# Patient Record
Sex: FEMALE | Race: WHITE | NOT HISPANIC OR LATINO | Employment: FULL TIME | URBAN - METROPOLITAN AREA
[De-identification: names, ages, dates, MRNs, and addresses within clinical notes are randomized per-mention and may not be internally consistent; named-entity substitution may affect disease eponyms.]

---

## 2017-01-16 ENCOUNTER — HOSPITAL ENCOUNTER (EMERGENCY)
Facility: HOSPITAL | Age: 27
Discharge: HOME | End: 2017-01-17
Attending: EMERGENCY MEDICINE | Admitting: EMERGENCY MEDICINE

## 2017-01-16 DIAGNOSIS — K08.89 PAIN, DENTAL: Primary | ICD-10-CM

## 2017-01-16 PROCEDURE — 99282 EMERGENCY DEPT VISIT SF MDM: CPT | Performed by: EMERGENCY MEDICINE

## 2017-01-16 PROCEDURE — 99283 EMERGENCY DEPT VISIT LOW MDM: CPT

## 2017-01-17 VITALS
TEMPERATURE: 98.1 F | DIASTOLIC BLOOD PRESSURE: 81 MMHG | OXYGEN SATURATION: 100 % | BODY MASS INDEX: 39.27 KG/M2 | RESPIRATION RATE: 16 BRPM | HEIGHT: 64 IN | SYSTOLIC BLOOD PRESSURE: 129 MMHG | WEIGHT: 230 LBS | HEART RATE: 91 BPM

## 2017-01-17 RX ORDER — HYDROCODONE BITARTRATE AND ACETAMINOPHEN 7.5; 325 MG/1; MG/1
TABLET ORAL
Qty: 12 TABLET | Refills: 0 | Status: SHIPPED | OUTPATIENT
Start: 2017-01-17 | End: 2019-02-10

## 2017-01-17 RX ORDER — HYDROCODONE BITARTRATE AND ACETAMINOPHEN 5; 325 MG/1; MG/1
2 TABLET ORAL ONCE
Status: COMPLETED | OUTPATIENT
Start: 2017-01-17 | End: 2017-01-17

## 2017-01-17 RX ADMIN — HYDROCODONE BITARTRATE AND ACETAMINOPHEN 2 TABLET: 5; 325 TABLET ORAL at 00:43

## 2017-01-17 NOTE — ED PROVIDER NOTES
Subjective   History of Present Illness  History of Present Illness    Chief complaint: Dental pain    Location: Left mandible    Quality/Severity:  Moderate    Timing/Duration: Patient had dental extraction of tooth #17 six days ago and has had persistent pain since.    Modifying Factors: Patient relates that the dentist had some difficulty with the extraction.    Associated Symptoms: Pain extending proximally and distally in the mandible.  The patient relates that 6 days ago she had extractions of teeth #16 and 17.  The patient has healed well on the maxillary extraction, but the mandibular extraction has caused her persistent pain.  The patient thought that perhaps she was getting a dental infection and did take 4 days worth of Amoxil that she had been previously prescribed.  The antibiotics had no effect on the nature of the pain.  No bleeding or discharge.    Narrative: See above    Review of Systems  No fever, bleeding, difficulty swallowing, neck swelling, or change in voice.  Past Medical History   Diagnosis Date   • Migraines        No Known Allergies    Past Surgical History   Procedure Laterality Date   •  section         No family history on file.    Social History     Social History   • Marital status: Single     Spouse name: N/A   • Number of children: N/A   • Years of education: N/A     Social History Main Topics   • Smoking status: Current Every Day Smoker     Packs/day: 0.50   • Smokeless tobacco: None   • Alcohol use Yes   • Drug use: No   • Sexual activity: Defer     Other Topics Concern   • None     Social History Narrative           Objective   Physical Exam   Constitutional: She is oriented to person, place, and time. She appears well-developed and well-nourished.   HENT:   Head: Normocephalic and atraumatic.   Examination of the oral pharynx shows that the extraction sites appear to be healing normally without any significant erythema or swelling.  Patient does have some facial  tenderness over the extraction site without any obvious bruising or swelling.  No trismus   Eyes: Conjunctivae and EOM are normal.   Neurological: She is alert and oriented to person, place, and time.   Nursing note and vitals reviewed.      Procedures    Final diagnoses:   Pain, dental            ED Course  ED Course   Comment By Time   01/17/17, 12:43 AM  The patient was reassured that there did not appear to be any infectious process at this time.  It was explained that her pain was most probably due to localized trauma.  Patient strongly advised to follow-up with her dentist. Eddie Rajan MD 01/17 0043                  MDM  Number of Diagnoses or Management Options  Pain, dental:   Risk of Complications, Morbidity, and/or Mortality  Presenting problems: low  Management options: low      Labs this visit  Lab Results (last 24 hours)     ** No results found for the last 24 hours. **        Prescribed on discharge             Medication List      New Prescriptions          HYDROcodone-acetaminophen 7.5-325 MG per tablet   Commonly known as:  NORCO   1 tablet every 4-6 hours as needed for pain           All lab results, imaging results and other tests were reviewed by Eddie Rajan MD and unless otherwise specified were found to be unremarkable.      Final diagnoses:   Pain, dental            Eddie Rajan MD  01/17/17 0044

## 2017-01-17 NOTE — ED NOTES
Chief Complaint   Patient presents with   • Dental Pain     The patient presents to room 2 ambulatory from waiting room complaining of pain to the left side of her mouth.  Reports she had her wisdom teeth pulled last Wednesday and is still having pain to the site.  She reports that she is unable to eat solids and is eating soups and drinking liquids.  Denies fever or chills.  Afebrile on assessment.  Respirations are even and unlabored with bilateral equal expansion. Family at the bedside.      Francie Meléndez RN  01/17/17 0007

## 2017-10-15 ENCOUNTER — APPOINTMENT (OUTPATIENT)
Dept: GENERAL RADIOLOGY | Facility: HOSPITAL | Age: 27
End: 2017-10-15

## 2017-10-15 ENCOUNTER — HOSPITAL ENCOUNTER (EMERGENCY)
Facility: HOSPITAL | Age: 27
Discharge: HOME | End: 2017-10-15
Attending: EMERGENCY MEDICINE | Admitting: EMERGENCY MEDICINE

## 2017-10-15 VITALS
WEIGHT: 220 LBS | RESPIRATION RATE: 16 BRPM | HEIGHT: 63 IN | SYSTOLIC BLOOD PRESSURE: 136 MMHG | BODY MASS INDEX: 38.98 KG/M2 | TEMPERATURE: 98.5 F | HEART RATE: 98 BPM | OXYGEN SATURATION: 98 % | DIASTOLIC BLOOD PRESSURE: 86 MMHG

## 2017-10-15 DIAGNOSIS — M25.511 ACUTE PAIN OF RIGHT SHOULDER: Primary | ICD-10-CM

## 2017-10-15 PROCEDURE — 99282 EMERGENCY DEPT VISIT SF MDM: CPT | Performed by: EMERGENCY MEDICINE

## 2017-10-15 PROCEDURE — 99283 EMERGENCY DEPT VISIT LOW MDM: CPT

## 2017-10-15 PROCEDURE — 73030 X-RAY EXAM OF SHOULDER: CPT

## 2017-10-15 RX ORDER — HYDROCODONE BITARTRATE AND ACETAMINOPHEN 5; 325 MG/1; MG/1
1 TABLET ORAL ONCE
Status: COMPLETED | OUTPATIENT
Start: 2017-10-15 | End: 2017-10-15

## 2017-10-15 RX ORDER — NAPROXEN 500 MG/1
500 TABLET ORAL 2 TIMES DAILY PRN
Qty: 30 TABLET | Refills: 0 | Status: SHIPPED | OUTPATIENT
Start: 2017-10-15 | End: 2017-12-17

## 2017-10-15 RX ADMIN — HYDROCODONE BITARTRATE AND ACETAMINOPHEN 1 TABLET: 5; 325 TABLET ORAL at 10:34

## 2017-10-15 NOTE — ED PROVIDER NOTES
Subjective   History of Present Illness  History of Present Illness    Chief complaint: Right shoulder pain    Location: Right shoulder    Quality/Severity:  Moderate, sharp    Timing/Onset/Duration: Over the last 2 weeks    Modifying Factors: It hurts to move, feels better to remain still    Associated Symptoms: No numbness, tingling, or weakness.  No fever or chills.    Narrative: This 27-year-old white female presents with right shoulder pain that started 2 weeks ago.  6 years ago the patient had a torn rotator cuff.  The patient denies any fever or chills.  The patient denies any numbness, tingling, weakness, or change in bladder or bowel function.    PCP:  [facility]      Review of Systems   Musculoskeletal:        Right shoulder pain   Neurological: Negative for weakness and numbness.        Medication List      ASK your doctor about these medications          HYDROcodone-acetaminophen 7.5-325 MG per tablet   Commonly known as:  NORCO   1 tablet every 4-6 hours as needed for pain           Past Medical History:   Diagnosis Date   • Migraines        No Known Allergies    Past Surgical History:   Procedure Laterality Date   •  SECTION     • TONSILLECTOMY         No family history on file.    Social History     Social History   • Marital status: Single     Spouse name: N/A   • Number of children: N/A   • Years of education: N/A     Social History Main Topics   • Smoking status: Current Every Day Smoker     Packs/day: 1.00   • Smokeless tobacco: Not on file   • Alcohol use Yes      Comment: will drink socially   • Drug use: No   • Sexual activity: Defer     Other Topics Concern   • Not on file     Social History Narrative   • No narrative on file           Objective   Physical Exam   Constitutional: She is oriented to person, place, and time. She appears well-developed and well-nourished. No distress.   ED Triage Vitals:  Temp: 98.5 °F (36.9 °C) (10/15/17 0957)  Heart Rate: 98 (10/15/17 0942)  Resp: 16 (10/15/17  0942)  BP: 136/86 (10/15/17 0942)  SpO2: 98 % (10/15/17 0957)  Temp src: Oral (10/15/17 0957)  Heart Rate Source: Radial (10/15/17 0942)  Patient Position: n/a  BP Location: n/a  FiO2 (%): n/a    The patient's vitals were reviewed by me.  Unless otherwise noted they are within normal limits.     Musculoskeletal:   There is tenderness upon palpation of the right shoulder.  The capillary refill is less than 2 seconds.  The sensation is intact.  There is decreased abduction secondary to pain.  The radial and ulnar pulses 2+.   Neurological: She is alert and oriented to person, place, and time.   Skin: Skin is dry. No rash noted. She is not diaphoretic. No erythema. No pallor.   Psychiatric: Her behavior is normal.   Nursing note and vitals reviewed.      Procedures         ED Course  ED Course      11:41 AM, 10/15/17:  The patient is diagnosis of right shoulder pain was discussed with her.  I will write her prescription for pain medication.  The patient should follow-up with Dr. Hopper in 1 week.  She should return to emerge department there is increasing pain, numbness, tingling, weakness, worse in any way at all.  All the patient's questions were answered patient will be discharged in good condition.    11:43 AM, 10/15/17:  The patient's Peter report was reviewed by me.  The report number is [id number].            MDM  No orders to display     Labs Reviewed - No data to display  No results found.    Final diagnoses:   None         ED Medications:  Medications - No data to display    New Medications:     Medication List      ASK your doctor about these medications          HYDROcodone-acetaminophen 7.5-325 MG per tablet   Commonly known as:  NORCO   1 tablet every 4-6 hours as needed for pain           Stopped Medications:     Medication List      ASK your doctor about these medications          HYDROcodone-acetaminophen 7.5-325 MG per tablet   Commonly known as:  NORCO   1 tablet every 4-6 hours as needed for pain              Final diagnoses:   Acute pain of right shoulder            Ryland Willson MD  10/15/17 5749

## 2017-10-15 NOTE — DISCHARGE INSTRUCTIONS
Follow-up with Dr. Hopper in 1 week.  Return to emergency department there is increasing pain, numbness, tingling, weakness, worse in any way at all.

## 2017-10-20 ENCOUNTER — OFFICE VISIT (OUTPATIENT)
Dept: ORTHOPEDIC SURGERY | Facility: CLINIC | Age: 27
End: 2017-10-20

## 2017-10-20 VITALS
HEIGHT: 63 IN | HEART RATE: 83 BPM | WEIGHT: 220 LBS | BODY MASS INDEX: 38.98 KG/M2 | SYSTOLIC BLOOD PRESSURE: 107 MMHG | DIASTOLIC BLOOD PRESSURE: 72 MMHG

## 2017-10-20 DIAGNOSIS — S49.91XA INJURY OF RIGHT SHOULDER, INITIAL ENCOUNTER: Primary | ICD-10-CM

## 2017-10-20 PROCEDURE — 99203 OFFICE O/P NEW LOW 30 MIN: CPT | Performed by: NURSE PRACTITIONER

## 2017-10-20 RX ORDER — DIAZEPAM 5 MG/1
5 TABLET ORAL AS NEEDED
Qty: 2 TABLET | Refills: 0 | Status: SHIPPED | OUTPATIENT
Start: 2017-10-20 | End: 2017-10-27

## 2017-10-20 RX ORDER — CYCLOBENZAPRINE HCL 10 MG
10 TABLET ORAL 3 TIMES DAILY PRN
Qty: 30 TABLET | Refills: 0 | Status: SHIPPED | OUTPATIENT
Start: 2017-10-20 | End: 2019-02-10
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
## 2017-10-25 ENCOUNTER — HOSPITAL ENCOUNTER (OUTPATIENT)
Dept: MRI IMAGING | Facility: HOSPITAL | Age: 27
Discharge: HOME | End: 2017-10-25

## 2017-10-25 ENCOUNTER — HOSPITAL ENCOUNTER (OUTPATIENT)
Dept: GENERAL RADIOLOGY | Facility: HOSPITAL | Age: 27
Discharge: HOME | End: 2017-10-25
Admitting: NURSE PRACTITIONER

## 2017-10-25 DIAGNOSIS — S49.91XA INJURY OF RIGHT SHOULDER, INITIAL ENCOUNTER: ICD-10-CM

## 2017-10-25 PROCEDURE — 0 IOPAMIDOL 61 % SOLUTION: Performed by: NURSE PRACTITIONER

## 2017-10-25 PROCEDURE — 73222 MRI JOINT UPR EXTREM W/DYE: CPT

## 2017-10-25 PROCEDURE — A9577 INJ MULTIHANCE: HCPCS | Performed by: NURSE PRACTITIONER

## 2017-10-25 PROCEDURE — 73040 CONTRAST X-RAY OF SHOULDER: CPT

## 2017-10-25 PROCEDURE — 0 GADOBENATE DIMEGLUMINE 529 MG/ML SOLUTION: Performed by: NURSE PRACTITIONER

## 2017-10-25 RX ORDER — LIDOCAINE HYDROCHLORIDE 10 MG/ML
5 INJECTION, SOLUTION INFILTRATION; PERINEURAL ONCE
Status: COMPLETED | OUTPATIENT
Start: 2017-10-25 | End: 2017-10-25

## 2017-10-25 RX ADMIN — LIDOCAINE HYDROCHLORIDE 5 ML: 10 INJECTION, SOLUTION INFILTRATION; PERINEURAL at 12:00

## 2017-10-25 RX ADMIN — GADOBENATE DIMEGLUMINE 1 ML: 529 INJECTION, SOLUTION INTRAVENOUS at 11:15

## 2017-10-25 RX ADMIN — IOPAMIDOL 25 ML: 612 INJECTION, SOLUTION INTRAVENOUS at 12:00

## 2017-10-27 ENCOUNTER — OFFICE VISIT (OUTPATIENT)
Dept: ORTHOPEDIC SURGERY | Facility: CLINIC | Age: 27
End: 2017-10-27

## 2017-10-27 DIAGNOSIS — M19.011 ARTHROSIS OF RIGHT ACROMIOCLAVICULAR JOINT: ICD-10-CM

## 2017-10-27 DIAGNOSIS — M67.911 TENDINOPATHY OF RIGHT ROTATOR CUFF: Primary | ICD-10-CM

## 2017-10-27 PROCEDURE — 99213 OFFICE O/P EST LOW 20 MIN: CPT | Performed by: NURSE PRACTITIONER

## 2017-10-27 PROCEDURE — 20610 DRAIN/INJ JOINT/BURSA W/O US: CPT | Performed by: NURSE PRACTITIONER

## 2017-10-27 RX ORDER — TRIAMCINOLONE ACETONIDE 40 MG/ML
80 INJECTION, SUSPENSION INTRA-ARTICULAR; INTRAMUSCULAR
Status: COMPLETED | OUTPATIENT
Start: 2017-10-27 | End: 2017-10-27

## 2017-10-27 RX ORDER — BUPIVACAINE HYDROCHLORIDE 5 MG/ML
2 INJECTION, SOLUTION EPIDURAL; INTRACAUDAL
Status: COMPLETED | OUTPATIENT
Start: 2017-10-27 | End: 2017-10-27

## 2017-10-27 RX ORDER — LIDOCAINE HYDROCHLORIDE 10 MG/ML
2 INJECTION, SOLUTION EPIDURAL; INFILTRATION; INTRACAUDAL; PERINEURAL
Status: COMPLETED | OUTPATIENT
Start: 2017-10-27 | End: 2017-10-27

## 2017-10-27 RX ADMIN — BUPIVACAINE HYDROCHLORIDE 2 ML: 5 INJECTION, SOLUTION EPIDURAL; INTRACAUDAL at 09:43

## 2017-10-27 RX ADMIN — LIDOCAINE HYDROCHLORIDE 2 ML: 10 INJECTION, SOLUTION EPIDURAL; INFILTRATION; INTRACAUDAL; PERINEURAL at 09:43

## 2017-10-27 RX ADMIN — TRIAMCINOLONE ACETONIDE 80 MG: 40 INJECTION, SUSPENSION INTRA-ARTICULAR; INTRAMUSCULAR at 09:43
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
## 2017-11-07 ENCOUNTER — HOSPITAL ENCOUNTER (EMERGENCY)
Facility: HOSPITAL | Age: 27
Discharge: HOME | End: 2017-11-07
Attending: EMERGENCY MEDICINE | Admitting: EMERGENCY MEDICINE

## 2017-11-07 VITALS
OXYGEN SATURATION: 97 % | TEMPERATURE: 97.6 F | HEART RATE: 80 BPM | SYSTOLIC BLOOD PRESSURE: 136 MMHG | RESPIRATION RATE: 14 BRPM | WEIGHT: 220 LBS | HEIGHT: 63 IN | DIASTOLIC BLOOD PRESSURE: 79 MMHG | BODY MASS INDEX: 38.98 KG/M2

## 2017-11-07 DIAGNOSIS — G43.809 OTHER MIGRAINE WITHOUT STATUS MIGRAINOSUS, NOT INTRACTABLE: Primary | ICD-10-CM

## 2017-11-07 PROCEDURE — 99282 EMERGENCY DEPT VISIT SF MDM: CPT

## 2017-11-07 PROCEDURE — 25010000002 KETOROLAC TROMETHAMINE PER 15 MG: Performed by: EMERGENCY MEDICINE

## 2017-11-07 PROCEDURE — 96375 TX/PRO/DX INJ NEW DRUG ADDON: CPT

## 2017-11-07 PROCEDURE — 99282 EMERGENCY DEPT VISIT SF MDM: CPT | Performed by: EMERGENCY MEDICINE

## 2017-11-07 PROCEDURE — 25010000002 PROCHLORPERAZINE EDISYLATE PER 10 MG: Performed by: EMERGENCY MEDICINE

## 2017-11-07 PROCEDURE — 96365 THER/PROPH/DIAG IV INF INIT: CPT

## 2017-11-07 PROCEDURE — 25010000002 DEXAMETHASONE PER 1 MG: Performed by: EMERGENCY MEDICINE

## 2017-11-07 RX ORDER — DEXAMETHASONE SODIUM PHOSPHATE 10 MG/ML
INJECTION INTRAMUSCULAR; INTRAVENOUS
Status: DISCONTINUED
Start: 2017-11-07 | End: 2017-11-07 | Stop reason: HOSPADM

## 2017-11-07 RX ORDER — KETOROLAC TROMETHAMINE 30 MG/ML
30 INJECTION, SOLUTION INTRAMUSCULAR; INTRAVENOUS EVERY 6 HOURS PRN
Status: DISCONTINUED | OUTPATIENT
Start: 2017-11-07 | End: 2017-11-07 | Stop reason: HOSPADM

## 2017-11-07 RX ADMIN — KETOROLAC TROMETHAMINE 30 MG: 30 INJECTION INTRAMUSCULAR; INTRAVENOUS at 01:20

## 2017-11-07 RX ADMIN — SODIUM CHLORIDE 1000 ML: 9 INJECTION, SOLUTION INTRAVENOUS at 01:17

## 2017-11-07 RX ADMIN — DEXAMETHASONE SODIUM PHOSPHATE 10 MG: 4 INJECTION, SOLUTION INTRAMUSCULAR; INTRAVENOUS at 01:22

## 2017-11-07 RX ADMIN — PROCHLORPERAZINE EDISYLATE 10 MG: 5 INJECTION INTRAMUSCULAR; INTRAVENOUS at 01:18

## 2017-11-07 NOTE — ED PROVIDER NOTES
Subjective   History of Present Illness  History of Present Illness    Chief complaint: Headache    Location: Behind the eyes and evening around to the back of the head    Quality/Severity:  Moderate, sharp    Timing/Onset/Duration: Gradual onset since 2 PM today    Modifying Factors: Bright light bothers her eyes, nothing seems to make it better    Associated Symptoms: The patient denies any fever.  She has had chills.  She complains of sore throat secondary to the vomiting.  No earache or nasal congestion.  No chest pain or shortness of breath.  No abdominal pain other than some upper abdominal pain that she associates with the vomiting.  No diarrhea or burning when she urinates.  The first day of her last normal menstrual period was last of September.    Narrative: This 27-year-old white female with a history of migraine headaches presents with migraines is started at 20 3 PM.  She has been able to get relief.  She states they are like her usual migraines.  The patient denies any fever.  She has chills.  She denies any trauma.  She complains of nausea and vomiting is been nonbloody and nonbilious.  The patient denies any chest pain or shortness of breath.  There is abdominal pain related to vomiting.  There is no diarrhea or burning when she urinates.  The patient denies that she could be pregnant.  Patient has had a CT of her head that has been unremarkable and workup of her migraines.    PCP:Gaetano      Review of Systems   Constitutional: Positive for chills. Negative for fever.   HENT: Positive for sore throat. Negative for congestion and ear pain.    Eyes: Positive for photophobia.   Respiratory: Negative for chest tightness and shortness of breath.    Cardiovascular: Negative for chest pain.   Gastrointestinal: Positive for abdominal pain (mild upper abdominal pain), nausea and vomiting. Negative for diarrhea.   Genitourinary: Negative for dysuria.   Musculoskeletal: Negative for back pain and neck pain.    Skin: Negative for rash.   Neurological: Negative for weakness, numbness and headaches.   Hematological: Negative for adenopathy.   Psychiatric/Behavioral: Negative for confusion.        Medication List      ASK your doctor about these medications          cyclobenzaprine 10 MG tablet   Commonly known as:  FLEXERIL   Take 1 tablet by mouth 3 (Three) Times a Day As Needed for Muscle Spasms.       HYDROcodone-acetaminophen 7.5-325 MG per tablet   Commonly known as:  NORCO   1 tablet every 4-6 hours as needed for pain       naproxen 500 MG tablet   Commonly known as:  NAPROSYN   Take 1 tablet by mouth 2 (Two) Times a Day As Needed for Moderate Pain .           Past Medical History:   Diagnosis Date   • Asthma    • Depression    • Migraines        No Known Allergies    Past Surgical History:   Procedure Laterality Date   •  SECTION     • TONSILLECTOMY         Family History   Problem Relation Age of Onset   • Hypertension Mother    • Heart disease Mother    • Cancer Mother        Social History     Social History   • Marital status: Single     Spouse name: N/A   • Number of children: N/A   • Years of education: N/A     Social History Main Topics   • Smoking status: Current Every Day Smoker     Packs/day: 1.00   • Smokeless tobacco: Not on file   • Alcohol use Yes      Comment: will drink socially   • Drug use: No   • Sexual activity: Defer     Other Topics Concern   • Not on file     Social History Narrative           Objective   Physical Exam   Constitutional: She is oriented to person, place, and time. She appears well-developed and well-nourished. No distress.   ED Triage Vitals:  Temp: 97.6 °F (36.4 °C) (17)  Heart Rate: 82 (17)  Resp: 14 (17)  BP: 136/103 (17)  SpO2: 97 % (17)  Temp src: Oral (17)  Heart Rate Source: n/a  Patient Position: n/a  BP Location: n/a  FiO2 (%): n/a    The patient's vitals were reviewed by me.  Unless otherwise  noted they are within normal limits.  The patient is hypertensive with a blood pressure 136/103.     HENT:   Head: Normocephalic and atraumatic.   Right Ear: External ear normal.   Left Ear: External ear normal.   Nose: Nose normal.   Mouth/Throat: Oropharynx is clear and moist.   Eyes: Conjunctivae and EOM are normal. Pupils are equal, round, and reactive to light. Right eye exhibits no discharge. Left eye exhibits no discharge. No scleral icterus.   Neck: Normal range of motion. Neck supple. No JVD present. No tracheal deviation present. No thyromegaly present.   There is no meningeal signs   Cardiovascular: Normal rate, regular rhythm, normal heart sounds and intact distal pulses.  Exam reveals no gallop and no friction rub.    No murmur heard.  Pulmonary/Chest: Effort normal and breath sounds normal. No stridor. No respiratory distress. She has no wheezes. She has no rales. She exhibits no tenderness.   Abdominal: Soft. Bowel sounds are normal. She exhibits no distension and no mass. There is tenderness (mild upper abdominal pain at the patient relates to her vomiting). There is no rebound and no guarding. No hernia.   Musculoskeletal: Normal range of motion. She exhibits no edema or deformity.   Lymphadenopathy:     She has no cervical adenopathy.   Neurological: She is alert and oriented to person, place, and time. No cranial nerve deficit. She exhibits normal muscle tone. Coordination normal.   Skin: Skin is warm and dry. No rash noted. She is not diaphoretic. No erythema. No pallor.   Psychiatric: Her behavior is normal.   Nursing note and vitals reviewed.      Procedures         ED Course  ED Course    2:20 AM, 11/07/17:  The patient was reassessed.  Her vital signs were reviewed and are stable.  The patient feels much better and would like to go home.  Neurological exam: Conscious alert and oriented x 4 with no focal deficits noted.  There is no meningeal signs.    2:20 AM, 11/07/17:  Patient's diagnosis  of migraine headache was discussed with her.  She has been advised to follow-up with Dr. finney this week if not completely better.  She should return to emergency department if she has increasing pain, numbness, tingling, weakness, change in bladder or bowel function, fever, chills, worse in any way at all.  The patient's questions were answered she will be discharged in good condition.              MDM  No orders to display     Labs Reviewed - No data to display  Xr Shoulder 2+ View Right    Result Date: 10/16/2017  Narrative: INDICATION: Anterior shoulder pain for 2 weeks. Lifting injury.  FINDINGS: 3 views of the right shoulder without comparison. There is no acute fracture or dislocation. Acromioclavicular and glenohumeral articulations are normal.      Impression: Negative right shoulder.  This report was finalized on 10/16/2017 8:49 AM by Dr. Sergio Parisi MD.      Mri Shoulder Right Arthrogram    Result Date: 10/25/2017  Narrative: RIGHT SHOULDER MRI ARTHROGRAM 10/25/2017 HISTORY:  27-year-old female with right shoulder pain for 3 weeks status post possible lifting injury.  No prior right shoulder surgery. COMPARISON:  Right shoulder x-rays 10/15/2017.  Conventional right shoulder arthrogram 10/25/2017. TECHNIQUE:  Routine unenhanced multiplanar, multisequence high field MR imaging of the right shoulder was performed following the intraarticular administration of dilute gadolinium. FINDINGS:  There is mild supraspinatus and infraspinatus tendinopathy.  No evidence of tear.  Teres minor and subscapularis tendons are intact.  Long biceps tendon is intact and well positioned in the bicipital groove. Evaluation of the labrum is slightly limited by motion artifact on multiple sequences.  Allowing for this, there is no convincing evidence of a discrete or displaced SLAP tear.  The remainder of the glenoid labrum appears intact.  Glenohumeral articular cartilage is intact.  No loose intraarticular bodies. Minimal  degenerative change of the acromioclavicular joint.  No subacromial spur.  No significant inflammation of the subacromial/subdeltoid bursa.  Bone marrow signal is within expected limits.  Visualized musculature is unremarkable.     Impression: 1. Slightly motion limited exam. 2. Mild supraspinatus and infraspinatus tendinopathy.  No evidence of a rotator cuff tear. 3. No significant labral pathology. 4. Mild acromioclavicular joint arthrosis.      Fl Arthrogram Shoulder Right    Result Date: 10/25/2017  Narrative: RIGHT SHOULDER ARTHROGRAM PRIOR TO MRI, 10/25/2017:  HISTORY: 27-year-old female complaining of three week history of right shoulder pain. She reports a past diagnosis of rotator cuff tear about six years ago (details unavailable).  CONSENT: Procedure, risks and alternatives were discussed in detail with the patient, including the low risk of allergy and infection; patient questions were answered, and informed consent was obtained. Timeout was observed in the procedure room for patient identification and procedure site verification. The procedure site was marked by me.  PROCEDURE: *  Fluoroscopy time 01 minute, 23 seconds. *  7 fluoroscopic images were recorded.  Using sterile technique, 1% lidocaine local anesthetic and fluoroscopic guidance, a 22-gauge spinal needle was placed into the right shoulder joint capsule without difficulty. Arthrogram was performed using about 8 cc of iodinated contrast (Isovue-300) and dilute gadolinium contrast mixture (MultiHance). Arthrogram images were recorded during and after gentle shoulder range of motion maneuvers under fluoroscopic observation. The patient was then sent for MR imaging.  FINDINGS: No full-thickness rotator cuff tendon tear is demonstrated. Shoulder joint capsule appears normal. Please see MRI to follow.      Impression: Negative right shoulder arthrogram prior to MRI.  This report was finalized on 10/25/2017 12:01 PM by Dr. Bull López MD.         Final diagnoses:   None         ED Medications:  Medications - No data to display    New Medications:     Medication List      ASK your doctor about these medications          cyclobenzaprine 10 MG tablet   Commonly known as:  FLEXERIL   Take 1 tablet by mouth 3 (Three) Times a Day As Needed for Muscle Spasms.       HYDROcodone-acetaminophen 7.5-325 MG per tablet   Commonly known as:  NORCO   1 tablet every 4-6 hours as needed for pain       naproxen 500 MG tablet   Commonly known as:  NAPROSYN   Take 1 tablet by mouth 2 (Two) Times a Day As Needed for Moderate Pain .           Stopped Medications:     Medication List      ASK your doctor about these medications          cyclobenzaprine 10 MG tablet   Commonly known as:  FLEXERIL   Take 1 tablet by mouth 3 (Three) Times a Day As Needed for Muscle Spasms.       HYDROcodone-acetaminophen 7.5-325 MG per tablet   Commonly known as:  NORCO   1 tablet every 4-6 hours as needed for pain       naproxen 500 MG tablet   Commonly known as:  NAPROSYN   Take 1 tablet by mouth 2 (Two) Times a Day As Needed for Moderate Pain .             Final diagnoses:   Other migraine without status migrainosus, not intractable            Ryland Willson MD  11/07/17 0223

## 2017-11-07 NOTE — DISCHARGE INSTRUCTIONS
Follow-up with Dr. finney this week if not completely better.  Return to emergency department if you have increasing pain, numbness, tingling, weakness, change in bladder or bowel function, fever, chills, worse in any way at all.

## 2017-11-20 ENCOUNTER — TELEPHONE (OUTPATIENT)
Dept: ORTHOPEDIC SURGERY | Facility: CLINIC | Age: 27
End: 2017-11-20

## 2017-11-20 NOTE — TELEPHONE ENCOUNTER
PATIENT CALLED SAYING THAT MARICARMEN SAID IF SHE WAS FEELING UP TO GOING BACK TO WORK SOONER SHE COULD CALL BACK AND GET A NOTE.  PATIENT REQUESTING TO GO BACK TO WORK ON 11.24.17

## 2017-12-09 ENCOUNTER — APPOINTMENT (OUTPATIENT)
Dept: CT IMAGING | Facility: HOSPITAL | Age: 27
End: 2017-12-09

## 2017-12-09 ENCOUNTER — HOSPITAL ENCOUNTER (EMERGENCY)
Facility: HOSPITAL | Age: 27
Discharge: HOME | End: 2017-12-09
Attending: EMERGENCY MEDICINE | Admitting: EMERGENCY MEDICINE

## 2017-12-09 VITALS
TEMPERATURE: 97.6 F | RESPIRATION RATE: 20 BRPM | DIASTOLIC BLOOD PRESSURE: 85 MMHG | BODY MASS INDEX: 38.09 KG/M2 | HEART RATE: 111 BPM | SYSTOLIC BLOOD PRESSURE: 169 MMHG | HEIGHT: 63 IN | OXYGEN SATURATION: 99 % | WEIGHT: 215 LBS

## 2017-12-09 DIAGNOSIS — G43.009 MIGRAINE WITHOUT AURA AND WITHOUT STATUS MIGRAINOSUS, NOT INTRACTABLE: Primary | ICD-10-CM

## 2017-12-09 PROCEDURE — 99282 EMERGENCY DEPT VISIT SF MDM: CPT | Performed by: EMERGENCY MEDICINE

## 2017-12-09 PROCEDURE — 96374 THER/PROPH/DIAG INJ IV PUSH: CPT

## 2017-12-09 PROCEDURE — 25010000002 KETOROLAC TROMETHAMINE PER 15 MG: Performed by: EMERGENCY MEDICINE

## 2017-12-09 PROCEDURE — 96361 HYDRATE IV INFUSION ADD-ON: CPT

## 2017-12-09 PROCEDURE — 70450 CT HEAD/BRAIN W/O DYE: CPT

## 2017-12-09 PROCEDURE — 96375 TX/PRO/DX INJ NEW DRUG ADDON: CPT

## 2017-12-09 PROCEDURE — 25010000002 DEXAMETHASONE PER 1 MG

## 2017-12-09 PROCEDURE — 25010000002 PROCHLORPERAZINE EDISYLATE PER 10 MG: Performed by: EMERGENCY MEDICINE

## 2017-12-09 PROCEDURE — 99283 EMERGENCY DEPT VISIT LOW MDM: CPT

## 2017-12-09 RX ORDER — BUTALBITAL, ACETAMINOPHEN AND CAFFEINE 50; 325; 40 MG/1; MG/1; MG/1
1 TABLET ORAL EVERY 4 HOURS PRN
COMMUNITY
End: 2019-02-10

## 2017-12-09 RX ORDER — KETOROLAC TROMETHAMINE 30 MG/ML
30 INJECTION, SOLUTION INTRAMUSCULAR; INTRAVENOUS EVERY 6 HOURS PRN
Status: DISCONTINUED | OUTPATIENT
Start: 2017-12-09 | End: 2017-12-09 | Stop reason: HOSPADM

## 2017-12-09 RX ORDER — DEXAMETHASONE SODIUM PHOSPHATE 10 MG/ML
INJECTION INTRAMUSCULAR; INTRAVENOUS
Status: COMPLETED
Start: 2017-12-09 | End: 2017-12-09

## 2017-12-09 RX ADMIN — KETOROLAC TROMETHAMINE 30 MG: 30 INJECTION INTRAMUSCULAR; INTRAVENOUS at 18:45

## 2017-12-09 RX ADMIN — SODIUM CHLORIDE 1000 ML: 9 INJECTION, SOLUTION INTRAVENOUS at 18:40

## 2017-12-09 RX ADMIN — DEXAMETHASONE SODIUM PHOSPHATE 10 MG: 10 INJECTION INTRAMUSCULAR; INTRAVENOUS at 18:49

## 2017-12-09 RX ADMIN — PROCHLORPERAZINE EDISYLATE 10 MG: 5 INJECTION INTRAMUSCULAR; INTRAVENOUS at 18:40

## 2017-12-09 NOTE — ED PROVIDER NOTES
Subjective   History of Present Illness  History of Present Illness    Chief complaint: Headache    Location: Home    Quality/Severity:  Moderate, not worst headache of life, like usual migraine    Timing/Onset/Duration: Gradual onset around noon    Modifying Factors: Bright lights seem to make it worse, Fioricet did not make it better    Associated Symptoms: No fever chills or cough.  No sore throat earache or nasal congestion.  No chest pain or shortness breath.  No abdominal pain.  The patient had diarrhea ×2 this been nonbloody.  No fever or chills.    Narrative: This 27-year-old white female presents with a headache that began around noon and has gotten worse.  It is been a gradual onset headache that has not been the worst headache of her life.  The patient has been having more frequent migraines in usual.  She had 3 this month.  Patient took Fioricet just before 3:00 and is not feeling much better.  Her pain is from the back of her neck around to the front of her eyes.  He states it is like her usual migraine.  Patient is never had a CT or MRI workup of her migraine.  The patient's had nausea and vomiting is been nonbloody and nonbilious.  Patient denies any fever or chills.  No cough sore throat earache or nasal congestion.  No chest pain or shortness of breath.  No abdominal pain.  She has had nonbloody diarrhea ×2.  No burning when she urinates.  No vaginal discharge.  She denies trauma.  The first day of her last normal menstrual period, the patient started her menstrual period today.    PCP:  [facility]et[facility]      Review of Systems   Constitutional: Negative for chills and fever.   HENT: Negative for congestion, ear pain and sore throat.    Eyes: Positive for photophobia. Negative for pain and discharge.   Respiratory: Negative for cough, shortness of breath and wheezing.    Cardiovascular: Negative for chest pain.   Gastrointestinal: Positive for diarrhea, nausea and vomiting. Negative for abdominal pain, blood in  stool and constipation.   Genitourinary: Negative for difficulty urinating.   Musculoskeletal: Negative for back pain, neck pain and neck stiffness.   Skin: Negative for rash.   Neurological: Positive for facial asymmetry and headaches. Negative for syncope, weakness and numbness.   Hematological: Negative for adenopathy.   Psychiatric/Behavioral: Negative for confusion.        Medication List      ASK your doctor about these medications          butalbital-acetaminophen-caffeine -40 MG per tablet   Commonly known as:  FIORICET, ESGIC       cyclobenzaprine 10 MG tablet   Commonly known as:  FLEXERIL   Take 1 tablet by mouth 3 (Three) Times a Day As Needed for Muscle Spasms.       HYDROcodone-acetaminophen 7.5-325 MG per tablet   Commonly known as:  NORCO   1 tablet every 4-6 hours as needed for pain       naproxen 500 MG tablet   Commonly known as:  NAPROSYN   Take 1 tablet by mouth 2 (Two) Times a Day As Needed for Moderate Pain .           Past Medical History:   Diagnosis Date   • Asthma    • Depression    • Migraines        No Known Allergies    Past Surgical History:   Procedure Laterality Date   •  SECTION     • TONSILLECTOMY         Family History   Problem Relation Age of Onset   • Hypertension Mother    • Heart disease Mother    • Cancer Mother        Social History     Social History   • Marital status: Single     Spouse name: N/A   • Number of children: N/A   • Years of education: N/A     Social History Main Topics   • Smoking status: Current Every Day Smoker     Packs/day: 1.00   • Smokeless tobacco: None   • Alcohol use Yes      Comment: will drink socially   • Drug use: No   • Sexual activity: Defer     Other Topics Concern   • None     Social History Narrative   • None           Objective   Physical Exam   Constitutional: She is oriented to person, place, and time. She appears well-developed and well-nourished. No distress.   ED Triage Vitals:  Temp: 98.2 °F (36.8 °C) (17  1811)  Heart Rate: 93 (12/09/17 1811)  Resp: 20 (12/09/17 1811)  BP: 150/91 (12/09/17 1811)  SpO2: 99 % (12/09/17 1811)  Temp src: Oral (12/09/17 1811)  Heart Rate Source: n/a  Patient Position: Sitting (12/09/17 1811)  BP Location: Right arm (12/09/17 1811)  FiO2 (%): n/a    The patient's vitals were reviewed by me.  Unless otherwise noted they are within normal limits.     HENT:   Head: Normocephalic and atraumatic.   Right Ear: External ear normal.   Left Ear: External ear normal.   Nose: Nose normal.   Mouth/Throat: Oropharynx is clear and moist.   Eyes: Conjunctivae and EOM are normal. Pupils are equal, round, and reactive to light. Right eye exhibits no discharge. Left eye exhibits no discharge. No scleral icterus.   Neck: Normal range of motion. Neck supple. No JVD present. No tracheal deviation present. No thyromegaly present.   There is no meningeal signs   Cardiovascular: Normal rate, regular rhythm, normal heart sounds and intact distal pulses.  Exam reveals no gallop and no friction rub.    No murmur heard.  Pulmonary/Chest: Effort normal and breath sounds normal. No stridor. No respiratory distress. She has no wheezes. She has no rales. She exhibits no tenderness.   Abdominal: Soft. Bowel sounds are normal. She exhibits no distension and no mass. There is no tenderness. There is no rebound and no guarding. No hernia.   Musculoskeletal: Normal range of motion. She exhibits no edema or deformity.   Lymphadenopathy:     She has no cervical adenopathy.   Neurological: She is alert and oriented to person, place, and time. No cranial nerve deficit. She exhibits normal muscle tone. Coordination normal.   Skin: Skin is warm and dry. No rash noted. She is not diaphoretic. No erythema. No pallor.   Psychiatric: Her behavior is normal.   Nursing note and vitals reviewed.      Procedures         ED Course  ED Course    8:31 PM, 12/09/17:  Patient was reassessed.  She feels better.  Her vital signs were reviewed and  are stable.  Neurological exam: Conscious alert oriented ×4 with no focal deficits noted.  There is no meningeal signs.    8:31 PM, 12/09/17:  The patient's diagnosis of migraine was discussed with her.  Patient should follow-up with her primary care provider within one week.  She should return to emergency primary she has worsening pain, fever, chills, worse in any way at all.  All of the patient's questions were answered patient will be discharged in good condition.              MDM  CT Head Without Contrast    (Results Pending)     Labs Reviewed - No data to display  No results found.    Final diagnoses:   None         ED Medications:  Medications   sodium chloride 0.9 % bolus 1,000 mL (not administered)   prochlorperazine (COMPAZINE) injection 10 mg (not administered)   dexamethasone (DECADRON) 10 mg in sodium chloride 0.9 % IVPB (not administered)   ketorolac (TORADOL) injection 30 mg (not administered)       New Medications:     Medication List      ASK your doctor about these medications          butalbital-acetaminophen-caffeine -40 MG per tablet   Commonly known as:  FIORICET, ESGIC       cyclobenzaprine 10 MG tablet   Commonly known as:  FLEXERIL   Take 1 tablet by mouth 3 (Three) Times a Day As Needed for Muscle Spasms.       HYDROcodone-acetaminophen 7.5-325 MG per tablet   Commonly known as:  NORCO   1 tablet every 4-6 hours as needed for pain       naproxen 500 MG tablet   Commonly known as:  NAPROSYN   Take 1 tablet by mouth 2 (Two) Times a Day As Needed for Moderate Pain .           Stopped Medications:     Medication List      ASK your doctor about these medications          butalbital-acetaminophen-caffeine -40 MG per tablet   Commonly known as:  FIORICET, ESGIC       cyclobenzaprine 10 MG tablet   Commonly known as:  FLEXERIL   Take 1 tablet by mouth 3 (Three) Times a Day As Needed for Muscle Spasms.       HYDROcodone-acetaminophen 7.5-325 MG per tablet   Commonly known as:  NORCO   1  tablet every 4-6 hours as needed for pain       naproxen 500 MG tablet   Commonly known as:  NAPROSYN   Take 1 tablet by mouth 2 (Two) Times a Day As Needed for Moderate Pain .             Final diagnoses:   Migraine without aura and without status migrainosus, not intractable            Ryland Willson MD  12/09/17 2038

## 2017-12-10 NOTE — DISCHARGE INSTRUCTIONS
Follow-up with Dr. House in 1 week.  Return to emerge department if there is increasing headache, fever, numbness, tingling, weakness, change in bladder or bowel function, worse in any way at all.

## 2017-12-17 ENCOUNTER — HOSPITAL ENCOUNTER (EMERGENCY)
Facility: HOSPITAL | Age: 27
Discharge: HOME | End: 2017-12-17
Attending: EMERGENCY MEDICINE | Admitting: EMERGENCY MEDICINE

## 2017-12-17 ENCOUNTER — APPOINTMENT (OUTPATIENT)
Dept: GENERAL RADIOLOGY | Facility: HOSPITAL | Age: 27
End: 2017-12-17

## 2017-12-17 VITALS
TEMPERATURE: 98 F | RESPIRATION RATE: 16 BRPM | BODY MASS INDEX: 38.98 KG/M2 | OXYGEN SATURATION: 99 % | HEART RATE: 98 BPM | WEIGHT: 220 LBS | HEIGHT: 63 IN | SYSTOLIC BLOOD PRESSURE: 149 MMHG | DIASTOLIC BLOOD PRESSURE: 87 MMHG

## 2017-12-17 DIAGNOSIS — S90.02XA CONTUSION OF LEFT ANKLE, INITIAL ENCOUNTER: Primary | ICD-10-CM

## 2017-12-17 PROCEDURE — 73630 X-RAY EXAM OF FOOT: CPT

## 2017-12-17 PROCEDURE — 73610 X-RAY EXAM OF ANKLE: CPT

## 2017-12-17 PROCEDURE — 99283 EMERGENCY DEPT VISIT LOW MDM: CPT

## 2017-12-17 RX ORDER — IBUPROFEN 600 MG/1
600 TABLET ORAL EVERY 6 HOURS PRN
COMMUNITY
End: 2019-02-10

## 2017-12-17 RX ORDER — NAPROXEN 500 MG/1
500 TABLET ORAL 2 TIMES DAILY PRN
Qty: 30 TABLET | Refills: 0 | Status: SHIPPED | OUTPATIENT
Start: 2017-12-17 | End: 2017-12-17 | Stop reason: HOSPADM

## 2017-12-18 NOTE — DISCHARGE INSTRUCTIONS
Alternate Tylenol and Motrin as needed for discomfort. Ice for 24-48 hours; then can switch to heat. Follow up with Ortho if not improving.

## 2017-12-18 NOTE — ED PROVIDER NOTES
Subjective   History of Present Illness  History of Present Illness    Chief complaint: Foot pain     Location: Left     Quality/Severity:  Throbbing     Timing/Duration: 3 hours ago     Modifying Factors: Pain worse with weight bearing; improved by elevation     Associated Symptoms: syncope due to pain     Narrative: 26 yo female who presents today with complaint of left foot pain after rolling it on uneven pavement. Patient reports lars the pain was so intense she fainted; this was witnessed and she recovered quickly.     Review of Systems   HENT:        Recently treated here for migraine; denies HA currently    Cardiovascular: Negative for chest pain.   Musculoskeletal:        Left posterior/lateral foot pain    Skin: Positive for color change.        Reports bruising to left foot and abrasion to right knee secondary to fainting     Neurological: Negative for light-headedness.   All other systems reviewed and are negative.      Past Medical History:   Diagnosis Date   • Asthma    • Depression    • Migraines        No Known Allergies    Past Surgical History:   Procedure Laterality Date   •  SECTION     • TONSILLECTOMY         Family History   Problem Relation Age of Onset   • Hypertension Mother    • Heart disease Mother    • Cancer Mother        Social History     Social History   • Marital status: Single     Spouse name: N/A   • Number of children: N/A   • Years of education: N/A     Social History Main Topics   • Smoking status: Current Every Day Smoker     Packs/day: 1.00   • Smokeless tobacco: None   • Alcohol use Yes      Comment: will drink socially   • Drug use: No   • Sexual activity: Defer     Other Topics Concern   • None     Social History Narrative           Objective   Physical Exam   Constitutional: She is oriented to person, place, and time. She appears well-developed and well-nourished. No distress.   HENT:   Head: Normocephalic and atraumatic.   Eyes: Pupils are equal, round, and reactive  to light.   Neck: Normal range of motion.   Cardiovascular: Normal rate and regular rhythm.    Pulmonary/Chest: Effort normal.   Musculoskeletal: She exhibits edema and tenderness ( to light palpation of left post./lat. foot ). She exhibits no deformity.   Neurological: She is alert and oriented to person, place, and time.   Skin: Skin is warm and dry. She is not diaphoretic.   Mild ecchymosis noted to left posterior lateral foot. Abrasion to right knee.    Psychiatric: She has a normal mood and affect. Her behavior is normal. Judgment and thought content normal.   Nursing note and vitals reviewed.      Procedures         ED Course  ED Course      Left foot and ankle films: negative acute   Left Air Cast             MDM  Number of Diagnoses or Management Options     Amount and/or Complexity of Data Reviewed  Tests in the radiology section of CPT®: ordered  Discussion of test results with the performing providers: (Reviewed imaging with Dr. Vickers )    Risk of Complications, Morbidity, and/or Mortality  Presenting problems: low  Diagnostic procedures: moderate  Management options: low    Patient Progress  Patient progress: improved      Final diagnoses:   Contusion of left ankle, initial encounter            AMANDEEP Hua  12/17/17 2141       Elva Watson, AMANDEEP  12/17/17 2141       Elva Watson, AMANDEEP  12/17/17 2144

## 2017-12-18 NOTE — ED NOTES
An aircast was applied to the patient's left ankle and crutches were given to her. She was sized and trained on them.     Vanessa Kelley  12/17/17 [id number]

## 2019-02-10 ENCOUNTER — APPOINTMENT (OUTPATIENT)
Dept: GENERAL RADIOLOGY | Facility: HOSPITAL | Age: 29
End: 2019-02-10

## 2019-02-10 ENCOUNTER — HOSPITAL ENCOUNTER (EMERGENCY)
Facility: HOSPITAL | Age: 29
Discharge: HOME | End: 2019-02-10
Attending: EMERGENCY MEDICINE | Admitting: EMERGENCY MEDICINE

## 2019-02-10 VITALS
TEMPERATURE: 98.4 F | HEART RATE: 98 BPM | OXYGEN SATURATION: 97 % | WEIGHT: 228.38 LBS | SYSTOLIC BLOOD PRESSURE: 116 MMHG | RESPIRATION RATE: 16 BRPM | HEIGHT: 63 IN | DIASTOLIC BLOOD PRESSURE: 79 MMHG | BODY MASS INDEX: 40.46 KG/M2

## 2019-02-10 DIAGNOSIS — M79.672 LEFT FOOT PAIN: Primary | ICD-10-CM

## 2019-02-10 PROCEDURE — 73630 X-RAY EXAM OF FOOT: CPT

## 2019-02-10 PROCEDURE — 99283 EMERGENCY DEPT VISIT LOW MDM: CPT

## 2019-02-10 PROCEDURE — 99282 EMERGENCY DEPT VISIT SF MDM: CPT | Performed by: EMERGENCY MEDICINE

## 2019-02-10 RX ORDER — IBUPROFEN 600 MG/1
600 TABLET ORAL EVERY 6 HOURS PRN
Qty: 20 TABLET | Refills: 0 | Status: SHIPPED | OUTPATIENT
Start: 2019-02-10

## 2019-02-10 NOTE — DISCHARGE INSTRUCTIONS
No weightbearing until pain-free.  Use your crutches.  Keep your foot elevated as much as possible.

## 2019-02-10 NOTE — ED PROVIDER NOTES
Subjective     History provided by:  Patient    History of Present Illness    · Chief complaint: Foot pain    · Location: Left foot on the top of the foot    · Quality/Severity: Moderately severe constant pain that is associated with swelling    · Timing/Onset: Started a month ago, but has gotten worse the last 2 weeks.    · Modifying Factors: Standing, weightbearing, and walking exacerbates the pain.  The swelling gets better with elevation of her foot.    · Associated symptoms: She denies any fever or redness.    · Narrative: The patient is a 28-year-old white female complaining of left foot pain for the last month.  She states his gotten worse in the last 2 weeks.  She denies any history of trauma.  The pain is exacerbated by weightbearing.  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  She also is in school to become a nurse.    ED Triage Vitals [02/10/19 1058]   Temp Heart Rate Resp BP SpO2   97.7 °F (36.5 °C) 117 16 144/88 98 %      Temp src Heart Rate Source Patient Position BP Location FiO2 (%)   Oral Monitor Lying Left arm --       Review of Systems   Constitutional: Negative for activity change, appetite change, chills, diaphoresis, fatigue and fever.   HENT: Negative for congestion, dental problem, ear pain, hearing loss, mouth sores, postnasal drip, rhinorrhea, sinus pressure, sore throat and voice change.    Eyes: Negative for photophobia, pain, discharge, redness and visual disturbance.   Respiratory: Negative for cough, chest tightness, shortness of breath, wheezing and stridor.    Cardiovascular: Negative for chest pain, palpitations and leg swelling.   Gastrointestinal: Negative for abdominal pain, diarrhea, nausea and vomiting.   Genitourinary: Negative for difficulty urinating, dysuria, flank pain, frequency, hematuria, pelvic pain and urgency.   Musculoskeletal: Positive for gait problem (due to left foot pain). Negative for arthralgias, back pain, joint swelling, myalgias, neck pain and  neck stiffness.   Skin: Negative for color change and rash.   Neurological: Negative for dizziness, tremors, seizures, syncope, facial asymmetry, speech difficulty, weakness, light-headedness, numbness and headaches.   Hematological: Negative for adenopathy.   Psychiatric/Behavioral: Negative.  Negative for confusion and decreased concentration. The patient is not nervous/anxious.        Past Medical History:   Diagnosis Date   • Asthma    • Depression    • Migraines        No Known Allergies    Past Surgical History:   Procedure Laterality Date   •  SECTION     • SHOULDER SURGERY      right   • TONSILLECTOMY         Family History   Problem Relation Age of Onset   • Hypertension Mother    • Heart disease Mother    • Cancer Mother        Social History     Socioeconomic History   • Marital status: Single     Spouse name: Not on file   • Number of children: Not on file   • Years of education: Not on file   • Highest education level: Not on file   Tobacco Use   • Smoking status: Current Every Day Smoker     Packs/day: 1.00   Substance and Sexual Activity   • Alcohol use: Yes     Comment: will drink socially   • Drug use: No   • Sexual activity: Yes     Partners: Female           Objective   Physical Exam   Constitutional: She is oriented to person, place, and time. She appears well-developed and well-nourished. No distress.   The patient appears healthy in no acute distress.  She is obese.  Review of her vital signs: She is afebrile with temperature 97.7, tachycardic with a heart rate of 117, blood pressure elevated slightly 144/88, respirations normal 16 with a normal oxygen saturation of 98%.   HENT:   Head: Normocephalic and atraumatic.   Eyes: Conjunctivae are normal. Pupils are equal, round, and reactive to light.   Neck: Normal range of motion.   Musculoskeletal:   The patient has no swelling or tenderness of her left leg.  Her left ankle is nontender without swelling.  There is no ligamental laxity left  ankle.  Her left foot has soft tissue tenderness over the dorsum of the foot.  There is no bony deformities the left foot and currently no swelling of the left foot.  Her toes are neurovascularly intact.  She is full range of motion in her toes with some mild discomfort in the dorsum of the foot.   Neurological: She is alert and oriented to person, place, and time. No cranial nerve deficit.   No focal motor sensory deficit.   Skin: Skin is warm and dry. Capillary refill takes less than 2 seconds. No rash noted. She is not diaphoretic. No erythema. No pallor.   Psychiatric: She has a normal mood and affect. Her behavior is normal. Judgment and thought content normal.   Nursing note and vitals reviewed.      Procedures           ED Course  ED Course as of Feb 10 1558   Sun Feb 10, 2019   1208 [facility] Report [id number].  [TP]   1209 My interpretation the patient's left foot x-rays it was normal.  [TP]   1209 Etiology the patient's 1 month of left foot pain is unclear.  I recommended no weightbearing until the pain resolves.  The patient states she has a set of crutches at home that she can use.  She'll be placed off work for the next week.  I'll prescribe her Motrin 600 mg #20.  The patient will be instructed to follow-up with Dr. Anupam Deleon, podiatry early this week.  [TP]      ED Course User Index  [TP] Sharif Ray MD                  MDM  Number of Diagnoses or Management Options  Left foot pain: new and requires workup     Amount and/or Complexity of Data Reviewed  Tests in the radiology section of CPT®: ordered and reviewed  Independent visualization of images, tracings, or specimens: yes    Patient Progress  Patient progress: stable        Final diagnoses:   Left foot pain           Labs Reviewed - No data to display  XR Foot 3+ View Left   ED Interpretation   No fracture or bony abnormality.  No soft tissue foreign body.             Medication List      New Prescriptions    ibuprofen 600 MG tablet  Commonly  known as:  ADVIL,MOTRIN  Take 1 tablet by mouth Every 6 (Six) Hours As Needed for Moderate Pain    for up to 20 doses.               Sharif Ray MD  02/10/19 8401